# Patient Record
Sex: FEMALE | Race: OTHER | HISPANIC OR LATINO | Employment: UNEMPLOYED | URBAN - METROPOLITAN AREA
[De-identification: names, ages, dates, MRNs, and addresses within clinical notes are randomized per-mention and may not be internally consistent; named-entity substitution may affect disease eponyms.]

---

## 2023-12-05 ENCOUNTER — OFFICE VISIT (OUTPATIENT)
Dept: URGENT CARE | Facility: PHYSICIAN GROUP | Age: 23
End: 2023-12-05

## 2023-12-05 VITALS
SYSTOLIC BLOOD PRESSURE: 100 MMHG | DIASTOLIC BLOOD PRESSURE: 50 MMHG | BODY MASS INDEX: 19.55 KG/M2 | HEART RATE: 108 BPM | RESPIRATION RATE: 16 BRPM | TEMPERATURE: 99.2 F | HEIGHT: 68 IN | OXYGEN SATURATION: 96 % | WEIGHT: 129 LBS

## 2023-12-05 DIAGNOSIS — F41.0 PANIC DISORDER: ICD-10-CM

## 2023-12-05 PROCEDURE — 3078F DIAST BP <80 MM HG: CPT | Performed by: PHYSICIAN ASSISTANT

## 2023-12-05 PROCEDURE — 99204 OFFICE O/P NEW MOD 45 MIN: CPT | Performed by: PHYSICIAN ASSISTANT

## 2023-12-05 PROCEDURE — 3074F SYST BP LT 130 MM HG: CPT | Performed by: PHYSICIAN ASSISTANT

## 2023-12-05 RX ORDER — CLONAZEPAM 1 MG/1
1 TABLET ORAL 2 TIMES DAILY
Qty: 6 TABLET | Refills: 0 | Status: SHIPPED | OUTPATIENT
Start: 2023-12-05 | End: 2023-12-08

## 2023-12-05 ASSESSMENT — ENCOUNTER SYMPTOMS
CHILLS: 0
SHORTNESS OF BREATH: 0
CONSTIPATION: 0
FEVER: 0
NERVOUS/ANXIOUS: 1
COUGH: 0
HEADACHES: 0
NAUSEA: 0
ABDOMINAL PAIN: 0
VOMITING: 0
MYALGIAS: 0
SORE THROAT: 0
EYE PAIN: 0
DIARRHEA: 0

## 2023-12-06 NOTE — PROGRESS NOTES
Subjective:   Jenn Polk is a 23 y.o. female who presents for Anxiety (She is traveling from [place]. States that she has had increased anxiety since she has been in the [facility]. She only has one pill left. Asking for prescription for Clonazepam 1mg BID to get her through until she goes back to [place] next Wednesday. )      23-year-old female with a history of panic disorder is visiting the [facility] from her home country of [place].  She has had a variety of stressors symptoms in the [place] including family troubles and disagreements, financial troubles and recently learned about the terminal illness of her grandmother in [place] all of which has compounded her anxiety.  Typically she uses 1 mg of clonazepam as needed up to twice daily to treat her anxiety symptoms.  At her baseline in [place] she is needed to take 1 tablet every other week or less.  She did bring her [place] with her to the [place] however has used all of her prescription over the last 2 weeks [facility] and only has 1 tablet remaining.  She returns to [place] on 12/12 and is looking for temporary bridge to get her through    Review of Systems   Constitutional:  Negative for chills and fever.   HENT:  Negative for congestion, ear pain and sore throat.    Eyes:  Negative for pain.   Respiratory:  Negative for cough and shortness of breath.    Cardiovascular:  Negative for chest pain.   Gastrointestinal:  Negative for abdominal pain, constipation, diarrhea, nausea and vomiting.   Genitourinary:  Negative for dysuria.   Musculoskeletal:  Negative for myalgias.   Skin:  Negative for rash.   Neurological:  Negative for headaches.   Psychiatric/Behavioral:  The patient is nervous/anxious.        Medications, Allergies, and current problem list reviewed today in Epic.     Objective:     /50 (BP Location: Left arm, Patient Position: Sitting, BP Cuff Size: Adult long)   Pulse (!) 108   Temp 37.3 °C (99.2 °F) (Temporal)   Resp  "16   Ht 1.727 m (5' 8\")   Wt 58.5 kg (129 lb)   SpO2 96%     Physical Exam  Vitals reviewed.   Constitutional:       Appearance: Normal appearance.      Comments: Tearful   HENT:      Head: Normocephalic and atraumatic.      Right Ear: External ear normal.      Left Ear: External ear normal.      Nose: Nose normal.      Mouth/Throat:      Mouth: Mucous membranes are moist.   Eyes:      Conjunctiva/sclera: Conjunctivae normal.   Cardiovascular:      Rate and Rhythm: Regular rhythm. Tachycardia present.   Pulmonary:      Effort: Pulmonary effort is normal.   Skin:     General: Skin is warm and dry.      Capillary Refill: Capillary refill takes less than 2 seconds.   Neurological:      Mental Status: She is alert and oriented to person, place, and time.   Psychiatric:         Behavior: Behavior normal.         Thought Content: Thought content normal.         Judgment: Judgment normal.      Comments: Anxious mood         Assessment/Plan:     Diagnosis and associated orders:     1. Panic disorder  clonazePAM (KLONOPIN) 1 MG Tab    Controlled Substance Treatment Agreement    CANCELED: Controlled Substance Treatment Agreement         Comments/MDM:     Affect and mood are congruent, patient provided documentation from [place] from her psychiatrist to she has been in contact with who is able to see her as soon as she returns.  She has a fairly unique situation which normally I would not feel comfortable prescribing controlled substances for however she was quite reasonable.  I was able to provide her with 72-hour supply which if she uses carefully should last her and help her safely return to [place] or she can seek further care.  PDMP reviewed which showed no use assessable.  Discussed with protocol versus benefits risk of rapid discontinuation versus tapering versus suppression and sedation.  She demonstrated understanding         Differential diagnosis, natural history, supportive care, and indications for immediate " follow-up discussed.    Advised the patient to follow-up with the primary care physician for recheck, reevaluation, and consideration of further management.    Please note that this dictation was created using voice recognition software. I have made a reasonable attempt to correct obvious errors, but I expect that there are errors of grammar and possibly content that I did not discover before finalizing the note.    This note was electronically signed by Mihai Sauer PA-C